# Patient Record
Sex: FEMALE | Race: WHITE | HISPANIC OR LATINO | URBAN - METROPOLITAN AREA
[De-identification: names, ages, dates, MRNs, and addresses within clinical notes are randomized per-mention and may not be internally consistent; named-entity substitution may affect disease eponyms.]

---

## 2017-01-18 ENCOUNTER — OUTPATIENT (OUTPATIENT)
Dept: OUTPATIENT SERVICES | Facility: HOSPITAL | Age: 42
LOS: 1 days | Discharge: HOME | End: 2017-01-18

## 2017-06-27 DIAGNOSIS — R05 COUGH: ICD-10-CM

## 2019-01-29 ENCOUNTER — EMERGENCY (EMERGENCY)
Facility: HOSPITAL | Age: 44
LOS: 0 days | Discharge: HOME | End: 2019-01-29
Admitting: PHYSICIAN ASSISTANT

## 2019-01-29 VITALS — HEART RATE: 88 BPM | HEIGHT: 64 IN | WEIGHT: 171.96 LBS

## 2019-01-29 VITALS
OXYGEN SATURATION: 99 % | HEART RATE: 107 BPM | TEMPERATURE: 96 F | SYSTOLIC BLOOD PRESSURE: 122 MMHG | RESPIRATION RATE: 18 BRPM | DIASTOLIC BLOOD PRESSURE: 72 MMHG

## 2019-01-29 DIAGNOSIS — H92.01 OTALGIA, RIGHT EAR: ICD-10-CM

## 2019-01-29 DIAGNOSIS — J02.9 ACUTE PHARYNGITIS, UNSPECIFIED: ICD-10-CM

## 2019-01-29 DIAGNOSIS — Z79.899 OTHER LONG TERM (CURRENT) DRUG THERAPY: ICD-10-CM

## 2019-01-29 DIAGNOSIS — H92.09 OTALGIA, UNSPECIFIED EAR: ICD-10-CM

## 2019-01-29 NOTE — ED ADULT NURSE NOTE - OBJECTIVE STATEMENT
pt c/o nasal congestion, b/l ear pain and cough x 2 weeks; pt currently on antibiotics from [facility] without improvement. pt denies SOB, CP.

## 2019-01-29 NOTE — ED PROVIDER NOTE - PROVIDER TOKENS
TOKEN:'[id number]',FREE:[LAST:[Your Primary Care Physician],PHONE:[(   )    -],FAX:[(   )    -]]

## 2019-01-29 NOTE — ED PROVIDER NOTE - ENMT, MLM
Airway patent, Nasal mucosa clear. Mouth with normal mucosa. Throat has no vesicles, no oropharyngeal exudates and uvula is midline.  TM's with good cone of light b/l ; no erythema

## 2019-01-29 NOTE — ED PROVIDER NOTE - OBJECTIVE STATEMENT
42 y/o F, no significant PMHx, presents to the ED with complaints of right otalgia, cough and nasal congestion x two weeks. Patient admits to having been with a dull ache to her right ear, non-productive cough, nasal congestion and odynophagia. She states that she went to her PMD at onset of symptoms and was given Augmentin which she took for three days without improvement. She then went back to her physician and was given a Rx for Cefdinir which she has been taking. 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 She is not a smoker. She denies dyspnea, chest pain, fever, chills, nausea, vomiting, back pain and abdominal pain.

## 2019-01-29 NOTE — ED PROVIDER NOTE - CARE PROVIDER_API CALL
Daria Espana), Otolaryngology  [address] [place] [address]  Phone: [phone]  Fax: [phone]    Your Primary Care Physician,   Phone: (   )    -  Fax: (   )    -

## 2019-01-29 NOTE — ED PROVIDER NOTE - NSFOLLOWUPINSTRUCTIONS_ED_ALL_ED_FT
Follow up with your primary care physician/ENT physician.  See information sheet(s) for further discharge instructions.

## 2019-01-29 NOTE — ED PROVIDER NOTE - ENMT NEGATIVE STATEMENT, MLM
Ears: + right ear pain and no hearing problems.Nose: + nasal congestion and no nasal drainage.Mouth/Throat: + sore-throat, no dysphagia, no hoarseness.Neck: no lumps, no pain, no stiffness and no swollen glands.

## 2019-02-08 ENCOUNTER — APPOINTMENT (OUTPATIENT)
Dept: OTOLARYNGOLOGY | Facility: CLINIC | Age: 44
End: 2019-02-08
Payer: COMMERCIAL

## 2019-02-08 VITALS — HEIGHT: 63.78 IN | WEIGHT: 171.96 LBS | BODY MASS INDEX: 29.72 KG/M2

## 2019-02-08 DIAGNOSIS — Z78.9 OTHER SPECIFIED HEALTH STATUS: ICD-10-CM

## 2019-02-08 PROCEDURE — 92570 ACOUSTIC IMMITANCE TESTING: CPT

## 2019-02-08 PROCEDURE — 92557 COMPREHENSIVE HEARING TEST: CPT

## 2019-02-08 PROCEDURE — 99204 OFFICE O/P NEW MOD 45 MIN: CPT | Mod: 25

## 2019-02-08 NOTE — PHYSICAL EXAM
[Midline] : trachea located in midline position [Normal] : no rashes [] : [place]-Hallpike test is negative

## 2019-02-08 NOTE — ASSESSMENT
[FreeTextEntry1] : - reviewed audiogram, all WNL, reassured patient of this, residual symptoms of clogged sensation of right ear likely to resolve with time as she continues to recover from URI\par - VNG\par - f/up after VNG

## 2019-02-08 NOTE — CONSULT LETTER
[Dear  ___] : Dear  [unfilled], [Consult Letter:] : I had the pleasure of evaluating your patient, [unfilled]. [Please see my note below.] : Please see my note below. [Consult Closing:] : Thank you very much for allowing me to participate in the care of this patient.  If you have any questions, please do not hesitate to contact me. [Sincerely,] : Sincerely, [FreeTextEntry2] : Maribell Kohler MD [FreeTextEntry3] : Daria Espana MD\par Otolaryngology - Head & Neck Surgery\par

## 2019-02-08 NOTE — HISTORY OF PRESENT ILLNESS
[de-identified] : 43 year old patient is present today for otalgia for 2 weeks. She also developed scratchy throat at that time. She lost her voice completely, for several days, then returned after 2-3 days. She also had coughing, which kept her up at night. At that time she developed right ear pain, went to PCP, who rx'ed augmentin for an ear infection. She continued to have right ear pain, went back to PCP's office, seen by NP, rx'ed cefdinir, tested flu negative. Strep test equivocal. She continued to have sore throat, coughing, went to ED, was given benzonatate for cough. Ibuprofen helped a little with pain. Currently not having ear pain, last time was yesterday, 5-6/10 pain. 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 She also feels clogged in her ear, as though there is decreased sound. \par \par SHe also reports sensation of room spinning and off balance, no falling. Episodes lasts several seconds, then stops. Bending down exacerbates.

## 2019-02-21 ENCOUNTER — OUTPATIENT (OUTPATIENT)
Dept: OUTPATIENT SERVICES | Facility: HOSPITAL | Age: 44
LOS: 1 days | Discharge: HOME | End: 2019-02-21

## 2019-02-26 DIAGNOSIS — R42 DIZZINESS AND GIDDINESS: ICD-10-CM

## 2019-04-11 ENCOUNTER — APPOINTMENT (OUTPATIENT)
Dept: OTOLARYNGOLOGY | Facility: CLINIC | Age: 44
End: 2019-04-11
Payer: COMMERCIAL

## 2019-04-11 DIAGNOSIS — R42 DIZZINESS AND GIDDINESS: ICD-10-CM

## 2019-04-11 DIAGNOSIS — H93.8X9 OTHER SPECIFIED DISORDERS OF EAR, UNSPECIFIED EAR: ICD-10-CM

## 2019-04-11 PROCEDURE — 99212 OFFICE O/P EST SF 10 MIN: CPT | Mod: 25

## 2019-04-11 PROCEDURE — 69210 REMOVE IMPACTED EAR WAX UNI: CPT

## 2019-04-11 NOTE — PHYSICAL EXAM
[Hearing Loss Right Only] : normal [Hearing Loss Left Only] : normal [de-identified] : thin sheet of cerumen overlying TM, see procedures [Midline] : trachea located in midline position [Normal] : no rashes [] : [place]-Hallpike test is negative

## 2019-04-11 NOTE — CONSULT LETTER
[Dear  ___] : Dear  [unfilled], [Consult Letter:] : I had the pleasure of evaluating your patient, [unfilled]. [Please see my note below.] : Please see my note below. [Sincerely,] : Sincerely, [Consult Closing:] : Thank you very much for allowing me to participate in the care of this patient.  If you have any questions, please do not hesitate to contact me. [FreeTextEntry2] : Maribell Kohler MD  [FreeTextEntry3] : Daria Espana MD\par Otolaryngology - Head & Neck Surgery\par

## 2019-04-11 NOTE — REASON FOR VISIT
[Subsequent Evaluation] : a subsequent evaluation for [FreeTextEntry2] : otalgia, dizziness followup

## 2019-04-11 NOTE — HISTORY OF PRESENT ILLNESS
[de-identified] : 43 year old patient is present today for otalgia for 2 weeks. She also developed scratchy throat at that time. She lost her voice completely, for several days, then returned after 2-3 days. She also had coughing, which kept her up at night. At that time she developed right ear pain, went to PCP, who rx'ed augmentin for an ear infection. She continued to have right ear pain, went back to PCP's office, seen by NP, rx'ed cefdinir, tested flu negative. Strep test equivocal. She continued to have sore throat, coughing, went to ED, was given benzonatate for cough. Ibuprofen helped a little with pain. Currently not having ear pain, last time was yesterday, 5-6/10 pain. 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 She also feels clogged in her ear, as though there is decreased sound. \par \par SHe also reports sensation of room spinning and off balance, no falling. Episodes lasts several seconds, then stops. Bending down exacerbates.  [FreeTextEntry1] : 4/11/19: Patient following up on otalgia and dizziness. Overall she feels better. Patient had VNG performed;\par Patient states right otalgia started about 2 weeks ago; she took ibuprofen which helped. The next day, she feels that when she bends down she feels something in her right ear. Like a flap noise in he ear, happened 2 weeks ago, nothing since.

## 2022-09-08 ENCOUNTER — APPOINTMENT (OUTPATIENT)
Dept: OTOLARYNGOLOGY | Facility: CLINIC | Age: 47
End: 2022-09-08

## 2022-09-08 VITALS
WEIGHT: 187 LBS | SYSTOLIC BLOOD PRESSURE: 116 MMHG | HEART RATE: 112 BPM | HEIGHT: 63.78 IN | DIASTOLIC BLOOD PRESSURE: 81 MMHG | BODY MASS INDEX: 32.32 KG/M2 | OXYGEN SATURATION: 98 % | TEMPERATURE: 97.9 F

## 2022-09-08 DIAGNOSIS — R07.89 OTHER CHEST PAIN: ICD-10-CM

## 2022-09-08 DIAGNOSIS — R09.81 NASAL CONGESTION: ICD-10-CM

## 2022-09-08 PROCEDURE — 31575 DIAGNOSTIC LARYNGOSCOPY: CPT

## 2022-09-08 PROCEDURE — 99203 OFFICE O/P NEW LOW 30 MIN: CPT | Mod: 25

## 2022-09-08 NOTE — ASSESSMENT
[FreeTextEntry1] :  46-year-old female with recent history of left-sided nasal symptoms as well as chest heaviness when lying supine.  Exam today is completely normal.  Left nasal cavity is widely patent without evidence of infection sinusitis or allergy.  The patient has been asymptomatic from a nasal standpoint for the past few weeks and at this point I would recommend observation.  In terms of her heaviness of chest when lying supine or sleeping I did recommend follow-up with PCP and possibly referral to pulmonology.  On my exam there does not appear to be any ear, nose, throat reason for difficulty with breathing or dyspnea.  Patient can follow-up with me as needed.\par \par –Follow-up with PCP regarding chest heaviness when sleeping, possible consultation with pulmonology\par – Follow-up with me as needed

## 2022-09-08 NOTE — PROCEDURE
[de-identified] : -\par Pre-operative Diagnosis:    nasal congestion, dyspnea when lying supine\par Post-operative Diagnosis:   right-sided septal deviation\par Anesthesia: Topical - 1 % Lidocaine/Phenylephrine\par Procedure:  Flexible Laryngoscopy\par  \par Procedure Details:  \par The patient was placed in the sitting position.  After decongestant and anesthesia were applied the laryngoscope was passed.  The nasal cavities, nasopharynx, oropharynx, hypopharynx, and larynx were all examined.  Vocal folds were examined during respiration and phonation.  The following findings were noted:\par \par Findings:  \par Nose: Septum is  deviated to the rightturbinates are normal, nasal airways patent, mucosa normal\par Nasopharynx: Adenoids normal, no masses, eustachian tube normal\par Oropharynx: Pharyngeal walls symmetric and without lesion. Tonsils/fossae symmetric\par Hypopharynx: Hypopharynx and pyriform sinuses without lesion. No masses or asymmetry.  No pooling of secretions.\par Larynx:  Epiglottis and aryepiglottic folds were sharp and crisp bilaterally.  Bilateral false and true vocal folds normal appearance. Bilateral vocal folds fully mobile and symmetric.  Airway was widely patent.\par \par Condition: Stable.  Patient tolerated procedure well.\par \par Complications: None\par \par

## 2022-09-08 NOTE — REASON FOR VISIT
[Initial Consultation] : an initial consultation for [FreeTextEntry2] : Left nasal issues, chest discomfort
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
## 2023-05-22 ENCOUNTER — APPOINTMENT (OUTPATIENT)
Dept: OTOLARYNGOLOGY | Facility: CLINIC | Age: 48
End: 2023-05-22

## 2023-07-11 NOTE — DATA REVIEWED
[de-identified] : 2/8/19: type A tymps, hearing -8khz bilaterally, mlid SNHL at 8khz in right ear.  Retention Suture Text: Retention sutures were placed to support the closure and prevent dehiscence.

## 2023-10-18 ENCOUNTER — APPOINTMENT (OUTPATIENT)
Dept: OTOLARYNGOLOGY | Facility: CLINIC | Age: 48
End: 2023-10-18
Payer: COMMERCIAL

## 2023-10-18 DIAGNOSIS — R04.0 EPISTAXIS: ICD-10-CM

## 2023-10-18 DIAGNOSIS — R06.7 SNEEZING: ICD-10-CM

## 2023-10-18 DIAGNOSIS — J34.89 OTHER SPECIFIED DISORDERS OF NOSE AND NASAL SINUSES: ICD-10-CM

## 2023-10-18 PROCEDURE — 99213 OFFICE O/P EST LOW 20 MIN: CPT | Mod: 25

## 2023-10-18 PROCEDURE — 31231 NASAL ENDOSCOPY DX: CPT

## 2023-10-18 RX ORDER — LEVOCETIRIZINE DIHYDROCHLORIDE 5 MG/1
5 TABLET ORAL DAILY
Qty: 1 | Refills: 3 | Status: ACTIVE | COMMUNITY
Start: 2023-10-18 | End: 1900-01-01

## 2023-10-18 RX ORDER — AZELASTINE HYDROCHLORIDE AND FLUTICASONE PROPIONATE 137; 50 UG/1; UG/1
137-50 SPRAY, METERED NASAL
Qty: 1 | Refills: 4 | Status: ACTIVE | COMMUNITY
Start: 2023-10-18 | End: 1900-01-01

## 2024-02-22 ENCOUNTER — APPOINTMENT (OUTPATIENT)
Dept: OTOLARYNGOLOGY | Facility: CLINIC | Age: 49
End: 2024-02-22

## 2024-09-16 ENCOUNTER — NON-APPOINTMENT (OUTPATIENT)
Age: 49
End: 2024-09-16

## 2024-11-06 ENCOUNTER — APPOINTMENT (OUTPATIENT)
Dept: OTOLARYNGOLOGY | Facility: CLINIC | Age: 49
End: 2024-11-06
Payer: MEDICAID

## 2024-11-06 DIAGNOSIS — R06.7 SNEEZING: ICD-10-CM

## 2024-11-06 DIAGNOSIS — R09.81 NASAL CONGESTION: ICD-10-CM

## 2024-11-06 DIAGNOSIS — J30.89 OTHER ALLERGIC RHINITIS: ICD-10-CM

## 2024-11-06 PROCEDURE — 99213 OFFICE O/P EST LOW 20 MIN: CPT | Mod: 25

## 2024-11-06 PROCEDURE — 31231 NASAL ENDOSCOPY DX: CPT

## 2025-03-12 ENCOUNTER — APPOINTMENT (OUTPATIENT)
Dept: OTOLARYNGOLOGY | Facility: CLINIC | Age: 50
End: 2025-03-12